# Patient Record
Sex: MALE | Race: WHITE | NOT HISPANIC OR LATINO | Employment: FULL TIME | URBAN - METROPOLITAN AREA
[De-identification: names, ages, dates, MRNs, and addresses within clinical notes are randomized per-mention and may not be internally consistent; named-entity substitution may affect disease eponyms.]

---

## 2018-01-23 ENCOUNTER — OFFICE VISIT (OUTPATIENT)
Dept: URGENT CARE | Facility: PHYSICIAN GROUP | Age: 48
End: 2018-01-23
Payer: COMMERCIAL

## 2018-01-23 VITALS
SYSTOLIC BLOOD PRESSURE: 142 MMHG | HEART RATE: 79 BPM | OXYGEN SATURATION: 96 % | BODY MASS INDEX: 37.22 KG/M2 | DIASTOLIC BLOOD PRESSURE: 90 MMHG | TEMPERATURE: 98 F | WEIGHT: 260 LBS | HEIGHT: 70 IN | RESPIRATION RATE: 17 BRPM

## 2018-01-23 DIAGNOSIS — J01.00 ACUTE NON-RECURRENT MAXILLARY SINUSITIS: ICD-10-CM

## 2018-01-23 PROCEDURE — 99203 OFFICE O/P NEW LOW 30 MIN: CPT | Performed by: PHYSICIAN ASSISTANT

## 2018-01-23 RX ORDER — DOXYCYCLINE HYCLATE 100 MG
100 TABLET ORAL 2 TIMES DAILY
Qty: 14 TAB | Refills: 0 | Status: SHIPPED | OUTPATIENT
Start: 2018-01-23 | End: 2018-01-30

## 2018-01-23 ASSESSMENT — ENCOUNTER SYMPTOMS
DIARRHEA: 0
NAUSEA: 0
SINUS PRESSURE: 1
SORE THROAT: 0
CHILLS: 0
SINUS PAIN: 1
SHORTNESS OF BREATH: 0
SPUTUM PRODUCTION: 0
COUGH: 0
ABDOMINAL PAIN: 0
MUSCULOSKELETAL NEGATIVE: 1
DIZZINESS: 0
VOMITING: 0
FEVER: 0

## 2018-01-23 NOTE — PROGRESS NOTES
"Subjective:      Alex Fong is a 47 y.o. male who presents with Sinus Problem (headache. X 8 days)            Sinus Problem   This is a new problem. The current episode started 1 to 4 weeks ago (8 days). The problem is unchanged. There has been no fever. His pain is at a severity of 2/10. The pain is mild. Associated symptoms include congestion and sinus pressure. Pertinent negatives include no chills, coughing, ear pain, shortness of breath, sneezing or sore throat. Past treatments include oral decongestants. The treatment provided mild relief.     Patient denies history of frequent/recurrent sinus infections or sinus surgeries. No fevers, chills, body aches, cough, chest pain, or SOB. He has been taking OTC decongestants with mild relief.     Review of Systems   Constitutional: Negative for chills and fever.   HENT: Positive for congestion, sinus pain and sinus pressure. Negative for ear pain, sneezing and sore throat.    Respiratory: Negative for cough, sputum production and shortness of breath.    Cardiovascular: Negative for chest pain.   Gastrointestinal: Negative for abdominal pain, diarrhea, nausea and vomiting.   Genitourinary: Negative.    Musculoskeletal: Negative.    Neurological: Negative for dizziness.          Objective:     /90   Pulse 79   Temp 36.7 °C (98 °F)   Resp 17   Ht 1.778 m (5' 10\")   Wt 117.9 kg (260 lb)   SpO2 96%   BMI 37.31 kg/m²      Physical Exam   Constitutional: He is oriented to person, place, and time. He appears well-developed and well-nourished. No distress.   HENT:   Head: Normocephalic and atraumatic.       Right Ear: Hearing, tympanic membrane, external ear and ear canal normal.   Left Ear: Hearing, tympanic membrane, external ear and ear canal normal.   Nose: Mucosal edema present. Right sinus exhibits maxillary sinus tenderness. Right sinus exhibits no frontal sinus tenderness. Left sinus exhibits maxillary sinus tenderness. Left sinus exhibits no frontal " sinus tenderness.   Mouth/Throat: Posterior oropharyngeal erythema present. No oropharyngeal exudate or posterior oropharyngeal edema.   Eyes: Conjunctivae are normal. Pupils are equal, round, and reactive to light.   Neck: Normal range of motion.   Cardiovascular: Normal rate, regular rhythm and normal heart sounds.    No murmur heard.  Pulmonary/Chest: Effort normal and breath sounds normal. No respiratory distress. He has no wheezes. He has no rales.   Musculoskeletal: Normal range of motion.   Lymphadenopathy:     He has no cervical adenopathy.   Neurological: He is alert and oriented to person, place, and time.   Skin: Skin is warm and dry. He is not diaphoretic.   Psychiatric: He has a normal mood and affect. His behavior is normal.   Nursing note and vitals reviewed.         PMH:  has no past medical history on file.  MEDS:   Current Outpatient Prescriptions:   •  DM-Doxylamine-Acetaminophen (NYQUIL COLD & FLU PO), Take  by mouth., Disp: , Rfl:   •  Oxymetazoline HCl (SINEX LONG-ACTING NA), Spray  in nose., Disp: , Rfl:   •  doxycycline (VIBRAMYCIN) 100 MG Tab, Take 1 Tab by mouth 2 times a day for 7 days., Disp: 14 Tab, Rfl: 0  ALLERGIES:   Allergies   Allergen Reactions   • Penicillin G      SURGHX: History reviewed. No pertinent surgical history.  SOCHX:  reports that he has never smoked. He has never used smokeless tobacco.  FH: family history is not on file.       Assessment/Plan:     1. Acute non-recurrent maxillary sinusitis  - doxycycline (VIBRAMYCIN) 100 MG Tab; Take 1 Tab by mouth 2 times a day for 7 days.  Dispense: 14 Tab; Refill: 0    Advised patient symptoms are most likely viral in etiology, recommend supportive care. Increased fluids and rest. Discussed use of nedi-pot, humidifier, and Flonase nasal spray for symptomatic relief. Contingent abx given if symptoms persist/worsen after 3-4 days. The patient demonstrated a good understanding and agreed with the treatment plan.

## 2018-02-17 ENCOUNTER — OFFICE VISIT (OUTPATIENT)
Dept: URGENT CARE | Facility: PHYSICIAN GROUP | Age: 48
End: 2018-02-17
Payer: COMMERCIAL

## 2018-02-17 VITALS
WEIGHT: 265 LBS | HEART RATE: 100 BPM | OXYGEN SATURATION: 97 % | RESPIRATION RATE: 16 BRPM | BODY MASS INDEX: 37.94 KG/M2 | DIASTOLIC BLOOD PRESSURE: 88 MMHG | SYSTOLIC BLOOD PRESSURE: 140 MMHG | TEMPERATURE: 97.9 F | HEIGHT: 70 IN

## 2018-02-17 DIAGNOSIS — M54.50 ACUTE BILATERAL LOW BACK PAIN WITHOUT SCIATICA: ICD-10-CM

## 2018-02-17 PROCEDURE — 99214 OFFICE O/P EST MOD 30 MIN: CPT | Mod: 25 | Performed by: FAMILY MEDICINE

## 2018-02-17 PROCEDURE — 90471 IMMUNIZATION ADMIN: CPT | Performed by: FAMILY MEDICINE

## 2018-02-17 PROCEDURE — 90686 IIV4 VACC NO PRSV 0.5 ML IM: CPT | Performed by: FAMILY MEDICINE

## 2018-02-17 RX ORDER — CYCLOBENZAPRINE HCL 10 MG
10 TABLET ORAL
Qty: 15 TAB | Refills: 0 | Status: SHIPPED | OUTPATIENT
Start: 2018-02-17 | End: 2018-03-06

## 2018-02-17 RX ORDER — MELOXICAM 15 MG/1
15 TABLET ORAL DAILY
Qty: 30 TAB | Refills: 0 | Status: SHIPPED | OUTPATIENT
Start: 2018-02-17 | End: 2018-03-06

## 2018-02-17 ASSESSMENT — ENCOUNTER SYMPTOMS
PERIANAL NUMBNESS: 0
PARESIS: 0
BOWEL INCONTINENCE: 0
NUMBNESS: 0
TINGLING: 0
BACK PAIN: 1

## 2018-02-17 NOTE — PROGRESS NOTES
"Subjective:   Alex Fong is a 47 y.o. male who presents for Back Pain (going down leggs, vomiting fever last week)        Back Pain   This is a new problem. The current episode started in the past 7 days. The problem occurs constantly. The problem has been gradually worsening since onset. The pain is present in the lumbar spine. The pain is moderate. Pertinent negatives include no bladder incontinence, bowel incontinence, numbness, paresis, perianal numbness or tingling.     Review of Systems   Gastrointestinal: Negative for bowel incontinence.   Genitourinary: Negative for bladder incontinence.   Musculoskeletal: Positive for back pain.   Neurological: Negative for tingling and numbness.     Allergies   Allergen Reactions   • Penicillin G       Objective:   /88   Pulse 100   Temp 36.6 °C (97.9 °F)   Resp 16   Ht 1.778 m (5' 10\")   Wt 120.2 kg (265 lb)   SpO2 97%   BMI 38.02 kg/m²   Physical Exam   Constitutional: He is oriented to person, place, and time. He appears well-developed and well-nourished. No distress.   HENT:   Head: Normocephalic and atraumatic.   Eyes: Conjunctivae and EOM are normal. Pupils are equal, round, and reactive to light.   Cardiovascular: Normal rate, regular rhythm and intact distal pulses.    No murmur heard.  Pulmonary/Chest: Effort normal and breath sounds normal. No respiratory distress.   Abdominal: Soft. He exhibits no distension. There is no tenderness.   Musculoskeletal:        Lumbar back: He exhibits decreased range of motion, tenderness and spasm. He exhibits no bony tenderness.   Neurological: He is alert and oriented to person, place, and time. He has normal reflexes. No sensory deficit.   Skin: Skin is warm and dry.   Psychiatric: He has a normal mood and affect. His behavior is normal.   Vitals reviewed.        Assessment/Plan:   Assessment    1. Acute bilateral low back pain without sciatica  - meloxicam (MOBIC) 15 MG tablet; Take 1 Tab by mouth every day.  " Dispense: 30 Tab; Refill: 0  - cyclobenzaprine (FLEXERIL) 10 MG Tab; Take 1 Tab by mouth at bedtime as needed.  Dispense: 15 Tab; Refill: 0  - Flu Quad Inj >3 Year Pre-Filled PF  Differential diagnosis, natural history, supportive care, and indications for immediate follow-up discussed.

## 2018-03-06 ENCOUNTER — OFFICE VISIT (OUTPATIENT)
Dept: MEDICAL GROUP | Facility: PHYSICIAN GROUP | Age: 48
End: 2018-03-06
Payer: COMMERCIAL

## 2018-03-06 VITALS
TEMPERATURE: 98.2 F | WEIGHT: 264 LBS | DIASTOLIC BLOOD PRESSURE: 88 MMHG | OXYGEN SATURATION: 95 % | HEART RATE: 91 BPM | BODY MASS INDEX: 36.96 KG/M2 | RESPIRATION RATE: 14 BRPM | SYSTOLIC BLOOD PRESSURE: 136 MMHG | HEIGHT: 71 IN

## 2018-03-06 DIAGNOSIS — M54.41 CHRONIC BILATERAL LOW BACK PAIN WITH RIGHT-SIDED SCIATICA: ICD-10-CM

## 2018-03-06 DIAGNOSIS — G89.29 CHRONIC BILATERAL LOW BACK PAIN WITH RIGHT-SIDED SCIATICA: ICD-10-CM

## 2018-03-06 DIAGNOSIS — R74.8 ELEVATED LIVER ENZYMES: ICD-10-CM

## 2018-03-06 DIAGNOSIS — I51.7 LEFT VENTRICULAR ENLARGEMENT: ICD-10-CM

## 2018-03-06 DIAGNOSIS — E66.9 OBESITY (BMI 35.0-39.9 WITHOUT COMORBIDITY): ICD-10-CM

## 2018-03-06 DIAGNOSIS — G45.9 MINI STROKE: ICD-10-CM

## 2018-03-06 DIAGNOSIS — Z87.898 HISTORY OF SNORING: ICD-10-CM

## 2018-03-06 DIAGNOSIS — Z85.47 HISTORY OF TESTICULAR CANCER: ICD-10-CM

## 2018-03-06 PROCEDURE — 99214 OFFICE O/P EST MOD 30 MIN: CPT | Performed by: NURSE PRACTITIONER

## 2018-03-06 RX ORDER — OMEPRAZOLE 20 MG/1
20 TABLET, DELAYED RELEASE ORAL DAILY
COMMUNITY

## 2018-03-06 ASSESSMENT — PATIENT HEALTH QUESTIONNAIRE - PHQ9: CLINICAL INTERPRETATION OF PHQ2 SCORE: 0

## 2018-03-06 NOTE — ASSESSMENT & PLAN NOTE
No longer on bp meds or blood thinners.  No symptoms of weakness reported.  Not taking a daily aspirin.

## 2018-03-06 NOTE — ASSESSMENT & PLAN NOTE
No concerns other than his oncologist in [place] recommended a CXR every 2 years.  He is requesting CXR today.

## 2018-03-06 NOTE — PROGRESS NOTES
"Alex Fong is a 47 y.o. male here today to establish care and for evaluation and management of:    HPI:    Left ventricular enlargement  No chest pain reported, dyspneic with stair climbing. No ankle swelling reported.     Mini stroke (CMS-HCC)  No longer on bp meds or blood thinners.  No symptoms of weakness reported.  Not taking a daily aspirin.     Obesity (BMI 35.0-39.9 without comorbidity) ([facility])  BMI 37.34 today.  No current exercise program.     History of testicular cancer  No concerns other than his oncologist in [place] recommended a CXR every 2 years.  He is requesting CXR today.     Low back pain  Reports that he had some back pain a couple of months ago, but this has resolved.       Current medicines (including changes today)  Current Outpatient Prescriptions   Medication Sig Dispense Refill   • omeprazole (PRILOSEC OTC) 20 MG tablet Take 20 mg by mouth every day.       No current facility-administered medications for this visit.        He  has a past medical history of Cancer (CMS-HCC); Diverticulosis; Fatty liver disease, nonalcoholic; Low back pain; and Mini stroke (CMS-HCC).    He  has a past surgical history that includes eye surgery and orchiectomy (Left).    Social History   Substance Use Topics   • Smoking status: Never Smoker   • Smokeless tobacco: Never Used   • Alcohol use No       Social History     Social History Narrative   • No narrative on file       Family History   Problem Relation Age of Onset   • Cancer Mother      thyroid   • Heart Disease Father    • Hypertension Father        Family Status   Relation Status   • Mother Alive   • Father          ROS  As stated in hpi  All other systems reviewed and are negative     Objective:     Blood pressure 136/88, pulse 91, temperature 36.8 °C (98.2 °F), resp. rate 14, height 1.791 m (5' 10.5\"), weight 119.7 kg (264 lb), SpO2 95 %. Body mass index is 37.34 kg/m².  Physical Exam:    Constitutional: Alert, no distress.  Skin: Warm, " dry, good turgor, no rashes in visible areas. Sebaceous cyst noted at top of scalp.   Eye: Equal, round and reactive, conjunctiva clear, lids normal.  ENMT: Lips without lesions, good dentition, oropharynx clear.  Neck: Trachea midline, no masses, no thyromegaly. No cervical or supraclavicular lymphadenopathy.  Respiratory: Unlabored respiratory effort, lungs clear to auscultation, no wheezes, no ronchi.  Cardiovascular: Normal S1, S2, no murmur, no edema.  Abdomen: Soft, non-tender, no masses, no hepatosplenomegaly.  Psych: Alert and oriented x3, normal affect and mood.        Assessment and Plan:   The following treatment plan was discussed    1. Chronic bilateral low back pain with right-sided sciatica  This is a new problem to me.  Chronic. Stable at this time. monitor    2. Left ventricular enlargement  This is a new problem to me.  Chronic.  Unknown etiology.  Request referral to cardiology based on events from his history of mini stroke/testicular cancer.  Referral placed.  Labs ordered.  Monitor and follow.   - CBC WITH DIFFERENTIAL; Future  - COMP METABOLIC PANEL; Future  - LIPID PROFILE; Future  - TSH; Future  - REFERRAL TO CARDIOLOGY    3. History of testicular cancer  This is a new problem to me.  Chronic. Stable.  Will order CXR per patient request from oncologist.  Monitor results.   - DX-CHEST-2 VIEWS; Future    4. Mini stroke (CMS-HCC)  This is a new problem to me.  Historical.  No acute issues.  Advised patient to add daily 81 mg asa daily.     5. Elevated liver enzymes  This is a new problem to me.  Historical.  Will draw labs.  Monitor and follow.  No alcohol by history    6. History of snoring  This is a new problem to me.  Chronic.  Will order overnight oxygen test.  Monitor and follow results.     7. Obesity (BMI 35.0-39.9 without comorbidity)  This is a new problem to me.  Chronic.  Diet and exercise discussed.   - Patient identified as having weight management issue.  Appropriate orders and  counseling given.      Records requested.  Followup: Return in about 6 months (around 9/6/2018) for Multiple issues.

## 2018-07-09 ENCOUNTER — OFFICE VISIT (OUTPATIENT)
Dept: URGENT CARE | Facility: PHYSICIAN GROUP | Age: 48
End: 2018-07-09
Payer: COMMERCIAL

## 2018-07-09 VITALS
WEIGHT: 263 LBS | SYSTOLIC BLOOD PRESSURE: 122 MMHG | TEMPERATURE: 97.9 F | DIASTOLIC BLOOD PRESSURE: 86 MMHG | HEIGHT: 70 IN | BODY MASS INDEX: 37.65 KG/M2 | HEART RATE: 97 BPM | OXYGEN SATURATION: 98 % | RESPIRATION RATE: 16 BRPM
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
## 2018-07-09 PROCEDURE — 99213 OFFICE O/P EST LOW 20 MIN: CPT | Performed by: PHYSICIAN ASSISTANT

## 2018-07-09 RX ORDER — SULFAMETHOXAZOLE AND TRIMETHOPRIM 800; 160 MG/1; MG/1
1 TABLET ORAL 2 TIMES DAILY
Qty: 14 TAB | Refills: 0 | Status: SHIPPED | OUTPATIENT
Start: 2018-07-09 | End: 2018-07-16

## 2018-07-09 ASSESSMENT — ENCOUNTER SYMPTOMS
DIARRHEA: 0
ROS SKIN COMMENTS: + INSECT BITE
SHORTNESS OF BREATH: 0
FEVER: 0
MUSCULOSKELETAL NEGATIVE: 1
SORE THROAT: 0
ABDOMINAL PAIN: 0
VOMITING: 0
DIZZINESS: 0
CHILLS: 0
NAUSEA: 0

## 2018-07-10 NOTE — PROGRESS NOTES
"Subjective:      Alex Fong is a 48 y.o. male who presents with Spider Bite (poss spider bite inside of L thigh, pain spreading down leg)        Patient is accompanied by his wife.     HPI   Patient presents to urgent care reporting a 1 week history of worsening swellingand pain on his left posterior thigh that he believes initially started as an insect bite. He is otherwise feeling well and denies fevers, chills, body aches, nausea, or vomiting. No history of MRSA.     Review of Systems   Constitutional: Negative for chills and fever.   HENT: Negative for congestion and sore throat.    Respiratory: Negative for shortness of breath.    Cardiovascular: Negative for chest pain.   Gastrointestinal: Negative for abdominal pain, diarrhea, nausea and vomiting.   Genitourinary: Negative.    Musculoskeletal: Negative.    Skin: Negative for itching and rash.        + insect bite   Neurological: Negative for dizziness.        Objective:     /86   Pulse 97   Temp 36.6 °C (97.9 °F)   Resp 16   Ht 1.778 m (5' 10\")   Wt 119.3 kg (263 lb)   SpO2 98%   BMI 37.74 kg/m²      Physical Exam   Constitutional: He is oriented to person, place, and time. He appears well-developed and well-nourished. No distress.   HENT:   Head: Normocephalic and atraumatic.   Eyes: Pupils are equal, round, and reactive to light.   Neck: Normal range of motion.   Cardiovascular: Normal rate.    Pulmonary/Chest: Effort normal.   Musculoskeletal: Normal range of motion.   Neurological: He is alert and oriented to person, place, and time.   Skin: Skin is warm and dry. He is not diaphoretic.        Area of raised erythema and induration noted on left posterior thigh with slight TTP. No warmth to touch or fluctuance present.    Psychiatric: He has a normal mood and affect. His behavior is normal.   Nursing note and vitals reviewed.         PMH:  has a past medical history of Cancer (HCC); Diverticulosis; Fatty liver disease, nonalcoholic; Low back " pain; and Mini stroke (HCC).  MEDS:   Current Outpatient Prescriptions:   •  sulfamethoxazole-trimethoprim (BACTRIM DS) 800-160 MG tablet, Take 1 Tab by mouth 2 times a day for 7 days., Disp: 14 Tab, Rfl: 0  •  omeprazole (PRILOSEC OTC) 20 MG tablet, Take 20 mg by mouth every day., Disp: , Rfl:   ALLERGIES:   Allergies   Allergen Reactions   • Penicillin G      SURGHX:   Past Surgical History:   Procedure Laterality Date   • EYE SURGERY      corneal transplants   • ORCHIECTOMY Left     testicular cancer     SOCHX:  reports that he has never smoked. He has never used smokeless tobacco. He reports that he does not drink alcohol or use drugs.  FH: family history includes Cancer in his mother; Heart Disease in his father; Hypertension in his father.       Assessment/Plan:     1. Bug bite with infection, initial encounter  - sulfamethoxazole-trimethoprim (BACTRIM DS) 800-160 MG tablet; Take 1 Tab by mouth 2 times a day for 7 days.  Dispense: 14 Tab; Refill: 0   - Complete full course of antibiotics as prescribed     No area of fluctuance today, I&D not indicated. Encouraged warm compresses 3-4 times daily and close monitoring of area. Call or return to office if symptoms persist or worsen. The patient demonstrated a good understanding and agreed with the treatment plan.

## 2018-08-28 ENCOUNTER — HOSPITAL ENCOUNTER (OUTPATIENT)
Dept: RADIOLOGY | Facility: MEDICAL CENTER | Age: 48
End: 2018-08-28
Attending: NURSE PRACTITIONER
Payer: COMMERCIAL

## 2018-08-28 ENCOUNTER — HOSPITAL ENCOUNTER (OUTPATIENT)
Dept: LAB | Facility: MEDICAL CENTER | Age: 48
End: 2018-08-28
Attending: NURSE PRACTITIONER
Payer: COMMERCIAL

## 2018-08-28 DIAGNOSIS — Z85.47 HISTORY OF TESTICULAR CANCER: ICD-10-CM

## 2018-08-28 DIAGNOSIS — I51.7 LEFT VENTRICULAR ENLARGEMENT: ICD-10-CM

## 2018-08-28 LAB
ALBUMIN SERPL BCP-MCNC: 4 G/DL (ref 3.2–4.9)
ALBUMIN/GLOB SERPL: 1.2 G/DL
ALP SERPL-CCNC: 80 U/L (ref 30–99)
ALT SERPL-CCNC: 75 U/L (ref 2–50)
ANION GAP SERPL CALC-SCNC: 6 MMOL/L (ref 0–11.9)
AST SERPL-CCNC: 39 U/L (ref 12–45)
BASOPHILS # BLD AUTO: 0.6 % (ref 0–1.8)
BASOPHILS # BLD: 0.05 K/UL (ref 0–0.12)
BILIRUB SERPL-MCNC: 0.4 MG/DL (ref 0.1–1.5)
BUN SERPL-MCNC: 15 MG/DL (ref 8–22)
CALCIUM SERPL-MCNC: 9.8 MG/DL (ref 8.5–10.5)
CHLORIDE SERPL-SCNC: 102 MMOL/L (ref 96–112)
CHOLEST SERPL-MCNC: 104 MG/DL (ref 100–199)
CO2 SERPL-SCNC: 27 MMOL/L (ref 20–33)
CREAT SERPL-MCNC: 0.93 MG/DL (ref 0.5–1.4)
EOSINOPHIL # BLD AUTO: 0.02 K/UL (ref 0–0.51)
EOSINOPHIL NFR BLD: 0.2 % (ref 0–6.9)
ERYTHROCYTE [DISTWIDTH] IN BLOOD BY AUTOMATED COUNT: 45.2 FL (ref 35.9–50)
GLOBULIN SER CALC-MCNC: 3.4 G/DL (ref 1.9–3.5)
GLUCOSE SERPL-MCNC: 371 MG/DL (ref 65–99)
HCT VFR BLD AUTO: 49.1 % (ref 42–52)
HDLC SERPL-MCNC: 48 MG/DL
HGB BLD-MCNC: 17.1 G/DL (ref 14–18)
IMM GRANULOCYTES # BLD AUTO: 0.03 K/UL (ref 0–0.11)
IMM GRANULOCYTES NFR BLD AUTO: 0.3 % (ref 0–0.9)
LDLC SERPL CALC-MCNC: 45 MG/DL
LYMPHOCYTES # BLD AUTO: 2.43 K/UL (ref 1–4.8)
LYMPHOCYTES NFR BLD: 27.1 % (ref 22–41)
MCH RBC QN AUTO: 31.8 PG (ref 27–33)
MCHC RBC AUTO-ENTMCNC: 34.8 G/DL (ref 33.7–35.3)
MCV RBC AUTO: 91.4 FL (ref 81.4–97.8)
MONOCYTES # BLD AUTO: 0.75 K/UL (ref 0–0.85)
MONOCYTES NFR BLD AUTO: 8.4 % (ref 0–13.4)
NEUTROPHILS # BLD AUTO: 5.7 K/UL (ref 1.82–7.42)
NEUTROPHILS NFR BLD: 63.4 % (ref 44–72)
NRBC # BLD AUTO: 0 K/UL
NRBC BLD-RTO: 0 /100 WBC
PLATELET # BLD AUTO: 154 K/UL (ref 164–446)
PMV BLD AUTO: 11.7 FL (ref 9–12.9)
POTASSIUM SERPL-SCNC: 4.3 MMOL/L (ref 3.6–5.5)
PROT SERPL-MCNC: 7.4 G/DL (ref 6–8.2)
RBC # BLD AUTO: 5.37 M/UL (ref 4.7–6.1)
SODIUM SERPL-SCNC: 135 MMOL/L (ref 135–145)
TRIGL SERPL-MCNC: 53 MG/DL (ref 0–149)
TSH SERPL DL<=0.005 MIU/L-ACNC: 1.37 UIU/ML (ref 0.38–5.33)
WBC # BLD AUTO: 9 K/UL (ref 4.8–10.8)

## 2018-08-28 PROCEDURE — 36415 COLL VENOUS BLD VENIPUNCTURE: CPT

## 2018-08-28 PROCEDURE — 85025 COMPLETE CBC W/AUTO DIFF WBC: CPT

## 2018-08-28 PROCEDURE — 80061 LIPID PANEL: CPT

## 2018-08-28 PROCEDURE — 80053 COMPREHEN METABOLIC PANEL: CPT

## 2018-08-28 PROCEDURE — 84443 ASSAY THYROID STIM HORMONE: CPT

## 2018-08-28 PROCEDURE — 71046 X-RAY EXAM CHEST 2 VIEWS: CPT

## 2018-08-29 ENCOUNTER — TELEPHONE (OUTPATIENT)
Dept: MEDICAL GROUP | Facility: PHYSICIAN GROUP | Age: 48
End: 2018-08-29

## 2018-08-29 NOTE — TELEPHONE ENCOUNTER
----- Message from ROGER Briggs sent at 8/29/2018  7:07 AM PDT -----  Please let patient know that labs are back.  I am not sure if this test was fasting or not?  The blood sugar is quite elevated.   We can review at your upcoming September appointment.    The chest xray results were all normal.  See you soon.   ROGER Briggs

## 2018-08-30 ENCOUNTER — OFFICE VISIT (OUTPATIENT)
Dept: MEDICAL GROUP | Facility: PHYSICIAN GROUP | Age: 48
End: 2018-08-30
Payer: COMMERCIAL

## 2018-08-30 VITALS
HEIGHT: 71 IN | TEMPERATURE: 97.5 F | BODY MASS INDEX: 36.4 KG/M2 | SYSTOLIC BLOOD PRESSURE: 132 MMHG | HEART RATE: 91 BPM | OXYGEN SATURATION: 98 % | DIASTOLIC BLOOD PRESSURE: 88 MMHG | WEIGHT: 260 LBS

## 2018-08-30 DIAGNOSIS — R74.8 ELEVATED LIVER ENZYMES: ICD-10-CM

## 2018-08-30 DIAGNOSIS — R73.9 ELEVATED BLOOD SUGAR: ICD-10-CM

## 2018-08-30 DIAGNOSIS — E11.9 TYPE 2 DIABETES MELLITUS WITHOUT COMPLICATION, WITHOUT LONG-TERM CURRENT USE OF INSULIN (HCC): ICD-10-CM

## 2018-08-30 DIAGNOSIS — Z85.47 HISTORY OF TESTICULAR CANCER: ICD-10-CM

## 2018-08-30 DIAGNOSIS — E66.9 DIABETES MELLITUS TYPE 2 IN OBESE (HCC): ICD-10-CM

## 2018-08-30 DIAGNOSIS — R73.09 ELEVATED GLUCOSE: ICD-10-CM

## 2018-08-30 DIAGNOSIS — E11.69 DIABETES MELLITUS TYPE 2 IN OBESE (HCC): ICD-10-CM

## 2018-08-30 LAB
HBA1C MFR BLD: 11.7 % (ref ?–5.8)
INT CON NEG: NEGATIVE
INT CON POS: POSITIVE

## 2018-08-30 PROCEDURE — 99214 OFFICE O/P EST MOD 30 MIN: CPT | Performed by: NURSE PRACTITIONER

## 2018-08-30 PROCEDURE — 83036 HEMOGLOBIN GLYCOSYLATED A1C: CPT | Performed by: NURSE PRACTITIONER

## 2018-08-30 RX ORDER — SIMVASTATIN 5 MG
5 TABLET ORAL EVERY EVENING
Qty: 30 TAB | Refills: 2 | Status: SHIPPED | OUTPATIENT
Start: 2018-08-30

## 2018-08-30 RX ORDER — LISINOPRIL 10 MG/1
10 TABLET ORAL DAILY
Qty: 30 TAB | Refills: 2 | Status: SHIPPED | OUTPATIENT
Start: 2018-08-30 | End: 2019-02-26 | Stop reason: SDUPTHER

## 2018-08-31 NOTE — PROGRESS NOTES
Chief Complaint   Patient presents with   • Abnormal Labs     lab review       Subjective:   Agapito Fong is a 48 y.o. male here today for evaluation and management of:    Elevated blood sugar  Reports that this summer he has been very very thirsty, having more difficulty with erections.  .  No immediate family history of diabetes.  BMI 36.78.      Elevated liver enzymes  Results for AGAPITO FONG (MRN [id number]) as of 8/30/2018 17:03   Ref. Range 8/28/2018 09:05 8/28/2018 09:46   AST(SGOT) Latest Ref Range: 12 - 45 U/L 39    ALT(SGPT) Latest Ref Range: 2 - 50 U/L 75 (H)    Alkaline Phosphatase Latest Ref Range: 30 - 99 U/L 80    Total Bilirubin Latest Ref Range: 0.1 - 1.5 mg/dL 0.4    Most recent labs show slightly elevated ALT.  No alcohol or medications.  This is reported as a historical finding.  No hx of hepatitis. No acute symptoms reported.     Diabetes mellitus type 2 in obese (HCC)  Patient had elevated bs of 371 on routine labs.  A1c today 11.7.  Has been thirsty and having difficulty with erections.  No numbness or tingling in feet reported.  No visual changes reported.  No chest pain, shortness of breath.  Patient reports he is eating lots of strudel and treats.  BMI 36.78    History of testicular cancer  Chest xray for follow up clear 8/18           Current medicines (including changes today)  Current Outpatient Prescriptions   Medication Sig Dispense Refill   • Ibuprofen (ADVIL PO) Take  by mouth.     • metFORMIN (GLUCOPHAGE) 500 MG Tab Take 1 Tab by mouth 2 times a day, with meals. 60 Tab 3   • lisinopril (PRINIVIL) 10 MG Tab Take 1 Tab by mouth every day. 30 Tab 2   • simvastatin (ZOCOR) 5 MG Tab Take 1 Tab by mouth every evening. 30 Tab 2   • omeprazole (PRILOSEC OTC) 20 MG tablet Take 20 mg by mouth every day.       No current facility-administered medications for this visit.      He  has a past medical history of Cancer (HCC); Diverticulosis; Fatty liver disease, nonalcoholic; Low back pain;  "and Mini stroke (HCC).    ROS as stated in hpi  No chest pain, no shortness of breath, no abdominal pain       Objective:     Blood pressure 132/88, pulse 91, temperature 36.4 °C (97.5 °F), height 1.791 m (5' 10.5\"), weight 117.9 kg (260 lb), SpO2 98 %. Body mass index is 36.78 kg/m². bp slightly elevated today  Physical Exam:  Constitutional: Alert, no distress.  Skin: Warm, dry, good turgor,no cyanosis, no rashes in visible areas.  Eye: Equal, round and reactive, conjunctiva clear, lids normal.  Ears: No tenderness, no discharge.  External canals are without any significant edema or erythema.  Tympanic membranes are without any inflammation, no effusion.  Gross auditory acuity is intact.  Nose: symmetrical without tenderness, no discharge.  Mouth/Throat: lips without lesion.  Oropharynx clear.  Throat without erythema, exudates or tonsillar enlargement.  Neck: Trachea midline, no masses, no obvious thyroid enlargement.. No cervical or supraclavicular lymphadenopathy. Range of motion within normal limits.  Neuro: Cranial nerves 2-12 grossly intact.  No sensory deficit.  Respiratory: Unlabored respiratory effort, lungs clear to auscultation, no wheezes, no ronchi.  Cardiovascular: Normal S1, S2, no murmur, no edema.  Abdomen: Soft, non-tender, no masses, no guarding,  no hepatosplenomegaly.  Psych: Alert and oriented x3, normal affect and mood and judgement.        Assessment and Plan:   The following treatment plan was discussed    1. Elevated glucose  This is a new problem to me.  Acute. Ongoing.  A1c 11.7.  See #4  - POCT  A1C    2. Elevated blood sugar  See number 4    3. Elevated liver enzymes  Chronic, ongoing. Stable at this time.     4. Type 2 diabetes mellitus without complication, without long-term current use of insulin (HCC)  This is anew problem to me.  Acute. Unstable.  Metformin 500 mg bid will increase in one month.  Statin, Ace and ASA started.  Referral to dietician for education.  Handout " regarding diet, portion control and exercise reviewed with patient.  Patient to return in 4 weeks for follow up.  At that time, we may have patient meet with DM RN in the next 6 months for monitoring and medication management.   - REFERRAL TO [facility] IMPROVEMENT PROGRAMS (HIP) Services Requested: Registered Dietitian for Medical Nutrition Therapy; Reason for Visit: Medical Condition Requiring Nutrition Counseling    5. Diabetes mellitus type 2 in obese (HCC)  See #4    6. History of testicular cancer  Chronic, ongoing. Stable.  Recent CXR was negative for any mets or lesions      Followup: Return in about 4 weeks (around 9/27/2018) for Diabetes.         Educated in proper administration of medication(s) ordered today including safety, possible SE, risks, benefits, rationale and alternatives to therapy.     Please note that this dictation was created using voice recognition software. I have made every reasonable attempt to correct obvious errors, but I expect that there are errors of grammar and possibly content that I did not discover before finalizing the note.

## 2018-08-31 NOTE — ASSESSMENT & PLAN NOTE
Patient had elevated bs of 371 on routine labs.  A1c today 11.7.  Has been thirsty and having difficulty with erections.  No numbness or tingling in feet reported.  No visual changes reported.  No chest pain, shortness of breath.  Patient reports he is eating lots of strudel and treats.  BMI 36.78

## 2018-08-31 NOTE — ASSESSMENT & PLAN NOTE
Results for AGAPITO DE LA CRUZ (MRN [id number]) as of 8/30/2018 17:03   Ref. Range 8/28/2018 09:05 8/28/2018 09:46   AST(SGOT) Latest Ref Range: 12 - 45 U/L 39    ALT(SGPT) Latest Ref Range: 2 - 50 U/L 75 (H)    Alkaline Phosphatase Latest Ref Range: 30 - 99 U/L 80    Total Bilirubin Latest Ref Range: 0.1 - 1.5 mg/dL 0.4    Most recent labs show slightly elevated ALT.  No alcohol or medications.  This is reported as a historical finding.  No hx of hepatitis. No acute symptoms reported.

## 2018-08-31 NOTE — ASSESSMENT & PLAN NOTE
Reports that this summer he has been very very thirsty, having more difficulty with erections.  .  No immediate family history of diabetes.  BMI 36.78.

## 2018-09-11 ENCOUNTER — OFFICE VISIT (OUTPATIENT)
Dept: URGENT CARE | Facility: PHYSICIAN GROUP | Age: 48
End: 2018-09-11
Payer: COMMERCIAL

## 2018-09-11 VITALS
OXYGEN SATURATION: 98 % | BODY MASS INDEX: 37.22 KG/M2 | HEART RATE: 97 BPM | TEMPERATURE: 96.5 F | HEIGHT: 70 IN | SYSTOLIC BLOOD PRESSURE: 126 MMHG | RESPIRATION RATE: 16 BRPM | DIASTOLIC BLOOD PRESSURE: 80 MMHG | WEIGHT: 260 LBS

## 2018-09-11 DIAGNOSIS — R10.9 ABDOMINAL PAIN, UNSPECIFIED ABDOMINAL LOCATION: ICD-10-CM

## 2018-09-11 PROCEDURE — 99214 OFFICE O/P EST MOD 30 MIN: CPT | Performed by: FAMILY MEDICINE

## 2018-09-11 RX ORDER — DICYCLOMINE HYDROCHLORIDE 10 MG/1
10 CAPSULE ORAL
Qty: 30 CAP | Refills: 0 | Status: SHIPPED | OUTPATIENT
Start: 2018-09-11

## 2018-09-11 ASSESSMENT — PAIN SCALES - GENERAL: PAINLEVEL: 10=SEVERE PAIN

## 2018-09-12 ASSESSMENT — ENCOUNTER SYMPTOMS
ABDOMINAL PAIN: 1
HEMATOCHEZIA: 0
ARTHRALGIAS: 0
CONSTIPATION: 0
DIARRHEA: 0
ANOREXIA: 0
HEADACHES: 0
FEVER: 0
MYALGIAS: 0
FLATUS: 0
VOMITING: 0
NAUSEA: 0
BELCHING: 0

## 2018-09-12 NOTE — PROGRESS NOTES
"Subjective:   Alex Fong is a 48 y.o. male who presents for Abdominal Pain (x2days )        Abdominal Pain   This is a new problem. The current episode started yesterday. The onset quality is sudden. The problem occurs constantly. The problem has been gradually worsening. The pain is located in the generalized abdominal region. The pain is mild. The quality of the pain is colicky and cramping. The abdominal pain does not radiate. Pertinent negatives include no anorexia, arthralgias, belching, constipation, diarrhea, dysuria, fever, flatus, headaches, hematochezia, hematuria, melena, myalgias, nausea or vomiting.     Review of Systems   Constitutional: Negative for fever.   Gastrointestinal: Positive for abdominal pain. Negative for anorexia, constipation, diarrhea, flatus, hematochezia, melena, nausea and vomiting.   Genitourinary: Negative for dysuria and hematuria.   Musculoskeletal: Negative for arthralgias and myalgias.   Neurological: Negative for headaches.     Allergies   Allergen Reactions   • Penicillin G       Objective:   /80   Pulse 97   Temp 35.8 °C (96.5 °F)   Resp 16   Ht 1.778 m (5' 10\")   Wt 117.9 kg (260 lb)   SpO2 98%   BMI 37.31 kg/m²   Physical Exam   Constitutional: He is oriented to person, place, and time. He appears well-developed and well-nourished. No distress.   HENT:   Head: Normocephalic and atraumatic.   Eyes: Pupils are equal, round, and reactive to light. Conjunctivae and EOM are normal.   Cardiovascular: Normal rate and regular rhythm.    No murmur heard.  Pulmonary/Chest: Effort normal and breath sounds normal. No respiratory distress.   Abdominal: Soft. He exhibits no distension. There is tenderness. There is no rebound and no guarding.   Neurological: He is alert and oriented to person, place, and time. He has normal reflexes. No sensory deficit.   Skin: Skin is warm and dry.   Psychiatric: He has a normal mood and affect.         Assessment/Plan:   Assessment  "   1. Abdominal pain, unspecified abdominal location  - dicyclomine (BENTYL) 10 MG Cap; Take 1 Cap by mouth 4 Times a Day,Before Meals and at Bedtime.  Dispense: 30 Cap; Refill: 0    Differential diagnosis, natural history, supportive care, and indications for immediate follow-up discussed.

## 2018-09-17 ENCOUNTER — TELEPHONE (OUTPATIENT)
Dept: MEDICAL GROUP | Facility: PHYSICIAN GROUP | Age: 48
End: 2018-09-17

## 2018-09-18 NOTE — TELEPHONE ENCOUNTER
Please advise patient to return to one/day of the metformin.  We will review together at appointment on 10/2/18  JEAN Briggs.

## 2018-09-18 NOTE — TELEPHONE ENCOUNTER
VOICEMAIL  1. Caller Name: Alex                      Call Back Number: [phone] (home)       2. Message: Patient had GI trouble when he increased his metformin to BID and was seen in UC. He seemed to do ok on 1 but not 2. He is wondering if he should just take one or change medications? Please advise     3. Patient approves office to leave a detailed voicemail/MyChart message: N\A

## 2018-09-18 NOTE — TELEPHONE ENCOUNTER
Phone Number Called: [phone] (home)       Message: patient advised of message below    Left Message for patient to call back: N\A

## 2018-10-02 ENCOUNTER — OFFICE VISIT (OUTPATIENT)
Dept: MEDICAL GROUP | Facility: PHYSICIAN GROUP | Age: 48
End: 2018-10-02
Payer: COMMERCIAL

## 2018-10-02 ENCOUNTER — HOSPITAL ENCOUNTER (OUTPATIENT)
Dept: RADIOLOGY | Facility: MEDICAL CENTER | Age: 48
End: 2018-10-02
Attending: NURSE PRACTITIONER
Payer: COMMERCIAL

## 2018-10-02 VITALS
HEART RATE: 92 BPM | BODY MASS INDEX: 37.22 KG/M2 | DIASTOLIC BLOOD PRESSURE: 82 MMHG | TEMPERATURE: 97.6 F | HEIGHT: 70 IN | OXYGEN SATURATION: 95 % | SYSTOLIC BLOOD PRESSURE: 130 MMHG | WEIGHT: 260 LBS

## 2018-10-02 DIAGNOSIS — E11.9 TYPE 2 DIABETES MELLITUS WITHOUT COMPLICATION, WITHOUT LONG-TERM CURRENT USE OF INSULIN (HCC): ICD-10-CM

## 2018-10-02 DIAGNOSIS — K57.30 DIVERTICULOSIS OF LARGE INTESTINE WITHOUT HEMORRHAGE: ICD-10-CM

## 2018-10-02 DIAGNOSIS — R10.30 LOWER ABDOMINAL PAIN: ICD-10-CM

## 2018-10-02 PROCEDURE — 74019 RADEX ABDOMEN 2 VIEWS: CPT

## 2018-10-02 PROCEDURE — 99214 OFFICE O/P EST MOD 30 MIN: CPT | Performed by: NURSE PRACTITIONER

## 2018-10-02 RX ORDER — METRONIDAZOLE 500 MG/1
500 TABLET ORAL 3 TIMES DAILY
Qty: 30 TAB | Refills: 0 | Status: SHIPPED | OUTPATIENT
Start: 2018-10-02 | End: 2019-02-26

## 2018-10-02 RX ORDER — SULFAMETHOXAZOLE AND TRIMETHOPRIM 800; 160 MG/1; MG/1
1 TABLET ORAL 2 TIMES DAILY
Qty: 20 TAB | Refills: 0 | Status: SHIPPED | OUTPATIENT
Start: 2018-10-02 | End: 2019-02-26

## 2018-10-02 NOTE — ASSESSMENT & PLAN NOTE
Reports past history of this based on colonoscopy.  Avoids seeds/nuts.   Last flare was approximiately one year ago.

## 2018-10-02 NOTE — PROGRESS NOTES
Chief Complaint   Patient presents with   • Diabetes   • GI Problem     x5 days, severe abd pain       Subjective:   Alex Fong is a 48 y.o. male here today for evaluation and management of:    Lower abdominal pain  Started to have some abdominal pain in lower abdomen last Friday.  Some constipation reported.  Took a laxative with some help yesterday.  Still having pain.  Pain is specific to lower abdomen. No nausea/vomitting, but a lot of gas.  No fever, chills reported.  Reports horrific pain at 10 that is intermittent.  Taking daily prilosec.  No black or tarry stools.  Reports that in the past he has had a history of diverticulitis.     Diverticulosis of large intestine  Reports past history of this based on colonoscopy.  Avoids seeds/nuts.   Last flare was approximiately one year ago.            Current medicines (including changes today)  Current Outpatient Prescriptions   Medication Sig Dispense Refill   • sulfamethoxazole-trimethoprim (BACTRIM DS) 800-160 MG tablet Take 1 Tab by mouth 2 times a day. 20 Tab 0   • metroNIDAZOLE (FLAGYL) 500 MG Tab Take 1 Tab by mouth 3 times a day. 30 Tab 0   • dicyclomine (BENTYL) 10 MG Cap Take 1 Cap by mouth 4 Times a Day,Before Meals and at Bedtime. 30 Cap 0   • metFORMIN (GLUCOPHAGE) 500 MG Tab Take 1 Tab by mouth 2 times a day, with meals. (Patient taking differently: Take 500 mg by mouth every day at 6 PM.) 60 Tab 3   • lisinopril (PRINIVIL) 10 MG Tab Take 1 Tab by mouth every day. 30 Tab 2   • simvastatin (ZOCOR) 5 MG Tab Take 1 Tab by mouth every evening. 30 Tab 2   • omeprazole (PRILOSEC OTC) 20 MG tablet Take 20 mg by mouth every day.     • Ibuprofen (ADVIL PO) Take  by mouth.       No current facility-administered medications for this visit.      He  has a past medical history of Cancer (HCC); Diverticulosis; Fatty liver disease, nonalcoholic; Low back pain; and Mini stroke (HCC).    ROS as stated in hpi  No chest pain, no shortness of breath, + abdominal  "pain       Objective:     Blood pressure 130/82, pulse 92, temperature 36.4 °C (97.6 °F), height 1.778 m (5' 10\"), weight 117.9 kg (260 lb), SpO2 95 %. Body mass index is 37.31 kg/m². afebrile  Physical Exam:  Constitutional: Alert, no distress.  Skin: Warm, dry, good turgor,no cyanosis, no rashes in visible areas.  Eye: Equal, round and reactive, conjunctiva clear, lids normal.  Ears: No tenderness, no discharge.  External canals are without any significant edema or erythema.  T Gross auditory acuity is intact.  Nose: symmetrical without tenderness, no discharge.  Mouth/Throat: lips without lesion.  Oropharynx clear.   Neck: Trachea midline, no masses, no obvious thyroid enlargement.. No cervical or supraclavicular lymphadenopathy. Range of motion within normal limits.  Neuro: Cranial nerves 2-12 grossly intact.  No sensory deficit.  Respiratory: Unlabored respiratory effort, lungs clear to auscultation, no wheezes, no ronchi.  Cardiovascular: Normal S1, S2, no murmur, no edema.  Abdomen: Soft,tender in lower quadrants.  Bowel tones faint in all quads.  Slight rebound tenderness in llq.   Psych: Alert and oriented x3, normal affect and mood and judgement.        Assessment and Plan:   The following treatment plan was discussed    1. Type 2 diabetes mellitus without complication, without long-term current use of insulin (HCC)  Chronic, ongoing. Due for labs.  Orders provided.  Monitor and folow.   - HEMOGLOBIN A1C; Future  - MICROALBUMIN CREAT RATIO URINE; Future    2. Lower abdominal pain  This is a new problem to me.  Acute.  This could represent diverticulitis but will order an xray to rule out blockage or constipation.  RX for treatment of diverticulitis Bactrim and Flagyl.  Patient to call me in 2 days with an update.  Monitor and follow results. ED precautions reviewed with patient who verbalizes understanding.   - UL-YGRCESV-1 VIEWS; Future    3. Diverticulosis of large intestine without hemorrhage  This is a " new problem to me.  Historical/finding on colonoscopy.  See #2      Followup: Return if symptoms worsen or fail to improve.         Educated in proper administration of medication(s) ordered today including safety, possible SE, risks, benefits, rationale and alternatives to therapy.     Please note that this dictation was created using voice recognition software. I have made every reasonable attempt to correct obvious errors, but I expect that there are errors of grammar and possibly content that I did not discover before finalizing the note.

## 2018-10-02 NOTE — ASSESSMENT & PLAN NOTE
Started to have some abdominal pain in lower abdomen last Friday.  Some constipation reported.  Took a laxative with some help yesterday.  Still having pain.  Pain is specific to lower abdomen. No nausea/vomitting, but a lot of gas.  No fever, chills reported.  Reports horrific pain at 10 that is intermittent.  Taking daily prilosec.  No black or tarry stools.  Reports that in the past he has had a history of diverticulitis.

## 2018-10-15 ENCOUNTER — TELEPHONE (OUTPATIENT)
Dept: MEDICAL GROUP | Facility: PHYSICIAN GROUP | Age: 48
End: 2018-10-15

## 2018-10-16 NOTE — TELEPHONE ENCOUNTER
VOICEMAIL  1. Caller Name: Alex                      Call Back Number: [phone] (home)       2. Message: Patient states he tried the metformin again and is still having stomach and intestinal pains, he is wondering if there is something else he can try? Please advise     3. Patient approves office to leave a detailed voicemail/MyChart message: N\A

## 2018-10-16 NOTE — TELEPHONE ENCOUNTER
Please let patient know that I have called over to Mor a medication called Januvia.  It is 100 mg daily.  This is a newer medication for treating diabetes and will help with his blood sugars and weight loss.  Please have patient make an appointment for follow up in December.   JEAN Briggs.

## 2019-02-19 ENCOUNTER — HOSPITAL ENCOUNTER (OUTPATIENT)
Dept: LAB | Facility: MEDICAL CENTER | Age: 49
End: 2019-02-19
Attending: NURSE PRACTITIONER
Payer: COMMERCIAL

## 2019-02-19 DIAGNOSIS — E11.9 TYPE 2 DIABETES MELLITUS WITHOUT COMPLICATION, WITHOUT LONG-TERM CURRENT USE OF INSULIN (HCC): ICD-10-CM

## 2019-02-19 LAB
CREAT UR-MCNC: 211.4 MG/DL
EST. AVERAGE GLUCOSE BLD GHB EST-MCNC: 160 MG/DL
HBA1C MFR BLD: 7.2 % (ref 0–5.6)
MICROALBUMIN UR-MCNC: 0.8 MG/DL
MICROALBUMIN/CREAT UR: 4 MG/G (ref 0–30)

## 2019-02-19 PROCEDURE — 83036 HEMOGLOBIN GLYCOSYLATED A1C: CPT

## 2019-02-19 PROCEDURE — 82043 UR ALBUMIN QUANTITATIVE: CPT

## 2019-02-19 PROCEDURE — 82570 ASSAY OF URINE CREATININE: CPT

## 2019-02-19 PROCEDURE — 36415 COLL VENOUS BLD VENIPUNCTURE: CPT

## 2019-02-20 ENCOUNTER — TELEPHONE (OUTPATIENT)
Dept: MEDICAL GROUP | Facility: PHYSICIAN GROUP | Age: 49
End: 2019-02-20

## 2019-02-20 NOTE — TELEPHONE ENCOUNTER
----- Message from ROGER Briggs sent at 2/20/2019  7:13 AM PST -----  Good morning, Alex  Thanks for getting updated labs prior to our appointment on the 26th.  Your A1c has made significant improvements since our last visit.  It is 7.2 down from 11.7.  Great work!.  See you soon  ROGER rBiggs

## 2019-02-20 NOTE — LETTER
February 20, 2019         Alex Fong  [id number]  [place] [address]        Dear Alex:      Below are the results from your recent visit:    Thanks for getting updated labs prior to our appointment on the 26th.  Your A1c has made significant improvements since our last visit.  It is 7.2 down from 11.7.  Great work!.  See you soon   ROGER Briggs     Resulted Orders   HEMOGLOBIN A1C   Result Value Ref Range    Glycohemoglobin 7.2 (H) 0.0 - 5.6 %      Comment:      Increased risk for diabetes:  5.7 -6.4%  Diabetes:  >6.4%  Glycemic control for adults with diabetes:  <7.0%  The above interpretations are per ADA guidelines.  Diagnosis  of diabetes mellitus on the basis of elevated Hemoglobin A1c  should be confirmed by repeating the Hb A1c test.      Est Avg Glucose 160 mg/dL      Comment:      The eAG calculation is based on the A1c-Derived Daily Glucose  (ADAG) study.  See the ADA's website for additional information.     MICROALBUMIN CREAT RATIO URINE   Result Value Ref Range    Creatinine, Urine 211.40 mg/dL    Microalbumin, Urine Random 0.8 mg/dL    Micro Alb Creat Ratio 4 0 - 30 mg/g     If you have any questions or concerns, please don't hesitate to call.    Electronically Signed

## 2019-02-26 ENCOUNTER — OFFICE VISIT (OUTPATIENT)
Dept: MEDICAL GROUP | Facility: PHYSICIAN GROUP | Age: 49
End: 2019-02-26
Payer: COMMERCIAL

## 2019-02-26 VITALS
HEIGHT: 70 IN | HEART RATE: 82 BPM | RESPIRATION RATE: 14 BRPM | OXYGEN SATURATION: 97 % | BODY MASS INDEX: 36.51 KG/M2 | SYSTOLIC BLOOD PRESSURE: 134 MMHG | WEIGHT: 255 LBS | DIASTOLIC BLOOD PRESSURE: 84 MMHG | TEMPERATURE: 97.5 F

## 2019-02-26 DIAGNOSIS — E11.69 DIABETES MELLITUS TYPE 2 IN OBESE (HCC): ICD-10-CM

## 2019-02-26 DIAGNOSIS — M54.41 CHRONIC BILATERAL LOW BACK PAIN WITH RIGHT-SIDED SCIATICA: ICD-10-CM

## 2019-02-26 DIAGNOSIS — G89.29 CHRONIC BILATERAL LOW BACK PAIN WITH RIGHT-SIDED SCIATICA: ICD-10-CM

## 2019-02-26 DIAGNOSIS — I51.7 LEFT VENTRICULAR ENLARGEMENT: ICD-10-CM

## 2019-02-26 DIAGNOSIS — E66.9 DIABETES MELLITUS TYPE 2 IN OBESE (HCC): ICD-10-CM

## 2019-02-26 DIAGNOSIS — E66.9 OBESITY (BMI 35.0-39.9 WITHOUT COMORBIDITY): ICD-10-CM

## 2019-02-26 PROBLEM — R73.9 ELEVATED BLOOD SUGAR: Status: RESOLVED | Noted: 2018-08-30 | Resolved: 2019-02-26

## 2019-02-26 PROCEDURE — 99214 OFFICE O/P EST MOD 30 MIN: CPT | Performed by: NURSE PRACTITIONER

## 2019-02-26 RX ORDER — LISINOPRIL 10 MG/1
10 TABLET ORAL DAILY
Qty: 30 TAB | Refills: 6 | Status: SHIPPED | OUTPATIENT
Start: 2019-02-26

## 2019-02-26 ASSESSMENT — PATIENT HEALTH QUESTIONNAIRE - PHQ9: CLINICAL INTERPRETATION OF PHQ2 SCORE: 0

## 2019-02-26 NOTE — ASSESSMENT & PLAN NOTE
A1c down to 7.2 today.  Only able to tolerate one metformin daily. Has had some 5 pound weight loss. Recently lost job, so he is stressed.  Reports that he is not walking as much as he was.

## 2019-02-26 NOTE — ASSESSMENT & PLAN NOTE
[facility] is following this issue.  Has appointment tomorrow.  Taking advil for this at this time.

## 2019-02-26 NOTE — PROGRESS NOTES
"Chief Complaint   Patient presents with   • Follow-Up   • Diabetes       Subjective:   Alex Fong is a 48 y.o. male here today for evaluation and management of:    Diabetes mellitus type 2 in obese ([facility])  A1c down to 7.2 today.  Only able to tolerate one metformin daily. Has had some 5 pound weight loss. Recently lost job, so he is stressed.  Reports that he is not walking as much as he was.      Low back pain  [facility] is following this issue.  Has appointment tomorrow.  Taking advil for this at this time.     Obesity (BMI 35.0-39.9 without comorbidity) ([facility])  BMI 36.59 today.  Down 5 pounds.     Left ventricular enlargement  Has not followed up with referral, most likely due to loss of job.  No chest pain reported, no shortness of breath or ankle edema reported.            Current medicines (including changes today)  Current Outpatient Prescriptions   Medication Sig Dispense Refill   • lisinopril (PRINIVIL) 10 MG Tab Take 1 Tab by mouth every day. 30 Tab 6   • metFORMIN (GLUCOPHAGE) 500 MG Tab Take 1 Tab by mouth 2 times a day, with meals. (Patient taking differently: Take 500 mg by mouth every day at 6 PM.) 60 Tab 3   • simvastatin (ZOCOR) 5 MG Tab Take 1 Tab by mouth every evening. 30 Tab 2   • omeprazole (PRILOSEC OTC) 20 MG tablet Take 20 mg by mouth every day.     • dicyclomine (BENTYL) 10 MG Cap Take 1 Cap by mouth 4 Times a Day,Before Meals and at Bedtime. (Patient not taking: Reported on 2/26/2019) 30 Cap 0   • Ibuprofen (ADVIL PO) Take  by mouth.       No current facility-administered medications for this visit.      He  has a past medical history of Cancer (HCC); Diverticulosis; Fatty liver disease, nonalcoholic; Low back pain; and Mini stroke ([facility]).    ROS as stated in hpi  No chest pain, no shortness of breath, no abdominal pain       Objective:     Blood pressure 134/84, pulse 82, temperature 36.4 °C (97.5 °F), temperature source Temporal, resp. rate 14, height 1.778 m (5' 10\"), weight 115.7 kg " (255 lb), SpO2 97 %. Body mass index is 36.59 kg/m².   Physical Exam:  Constitutional: Alert, no distress.  Skin: Warm, dry, good turgor,no cyanosis, no rashes in visible areas.  Eye: Equal, round and reactive, conjunctiva clear, lids normal.  Ears: No tenderness, no discharge.  External canals are without any significant edema or erythema..  Gross auditory acuity is intact.  Nose: symmetrical without tenderness, no discharge.  Mouth/Throat: lips without lesion.  Oropharynx clear.    Neck: Trachea midline, no masses, no obvious thyroid enlargement.. No cervical or supraclavicular lymphadenopathy. Range of motion within normal limits.  Neuro: Cranial nerves 2-12 grossly intact.  No sensory deficit.  Respiratory: Unlabored respiratory effort, lungs clear to auscultation, no wheezes, no ronchi.  Cardiovascular: Normal S1, S2, no murmur, no edema.  Psych: Alert and oriented x3, normal affect and mood and judgement.        Assessment and Plan:   The following treatment plan was discussed    1. Diabetes mellitus type 2 in obese (HCC)  Chronic, ongoing, improved A1c.  Continue with metformin, diet, exercise and weight loss.  Return in 6 months.     2. Chronic bilateral low back pain with right-sided sciatica  Chronic, ongoing. Followed by [facility].  Appointment tomorrow for worsening pain.     3. Obesity (BMI 35.0-39.9 without comorbidity) (HCC)  Chronic, ongoing, improved. Down 5 pounds.  Continue with lifestyle management. Monitor and follow.     4. Left ventricular enlargement  Chronic, historical issue.  Has not been able to afford visit due to recent job loss.  Red flag warnings reviewed.  Monitor.       Followup: Return in about 6 months (around 8/26/2019) for Diabetes.         Educated in proper administration of medication(s) ordered today including safety, possible SE, risks, benefits, rationale and alternatives to therapy.     Please note that this dictation was created using voice recognition software. I  have made every reasonable attempt to correct obvious errors, but I expect that there are errors of grammar and possibly content that I did not discover before finalizing the note.

## 2019-02-26 NOTE — ASSESSMENT & PLAN NOTE
Has not followed up with referral, most likely due to loss of job.  No chest pain reported, no shortness of breath or ankle edema reported.

## 2019-08-19 ENCOUNTER — OFFICE (OUTPATIENT)
Dept: URBAN - METROPOLITAN AREA CLINIC 78 | Facility: CLINIC | Age: 49
End: 2019-08-19

## 2019-08-19 VITALS
SYSTOLIC BLOOD PRESSURE: 135 MMHG | HEART RATE: 84 BPM | WEIGHT: 253 LBS | DIASTOLIC BLOOD PRESSURE: 88 MMHG | TEMPERATURE: 97.6 F | HEIGHT: 70 IN

## 2019-08-19 DIAGNOSIS — K21.9 GASTRO-ESOPHAGEAL REFLUX DISEASE WITHOUT ESOPHAGITIS: ICD-10-CM

## 2019-08-19 DIAGNOSIS — K57.92 DIVERTICULITIS OF INTESTINE, PART UNSPECIFIED, WITHOUT PERFO: ICD-10-CM

## 2019-08-19 DIAGNOSIS — R19.5 OTHER FECAL ABNORMALITIES: ICD-10-CM

## 2019-08-19 DIAGNOSIS — R10.32 LEFT LOWER QUADRANT PAIN: ICD-10-CM

## 2019-08-19 PROCEDURE — 99204 OFFICE O/P NEW MOD 45 MIN: CPT

## 2019-08-19 NOTE — SERVICEHPINOTES
DEBBIE SINGH   is a   49   male who presents with abdominal pain. Last year, sugar levels were elevated. Stared metformin and developed abdominal cramps.  Stopped taking it and was doing okay. BRLast May, went to ER with flank pain and told to have kidney stone. BRLast Sunday, developed lower abdominal cramps similar to when taking metformin. Not sure if has kidney stone or intestinal issues. Slight constipation then had some loose bowel, tarry and black. Denies fever or chills. BRNow BMs are back to normal. Still has slight pain at LLQ. Denies blood in stool or weight loss. BRDenies nausea, vomiting, dysphagia, dyspepsia or early satiety. BR+ Acid reflux, takes Prilosec 20 mg once daily over 19 years. Last EGD was done in 2008, reports a hx of h pylori s/p treatment back then. BRTook 2 tabs of Advil PM for lower abdominal pain.  Takes Advil once a week for back pain. Reports a hx of diverticulitis in 2010. BRHad a colonoscopy back then. Denies hx polyps. Advised to repeat it in 5 years. BRHx of testicular caner in 2015 s/p resection of left testicle. No chemo or radiation was required. Denies chest pain, palpitations or sob. BRDenies family history of colon cancer. BRSister had polyps in 50's. BR

## 2019-09-17 ENCOUNTER — OFFICE (OUTPATIENT)
Dept: URBAN - METROPOLITAN AREA CLINIC 32 | Facility: CLINIC | Age: 49
End: 2019-09-17

## 2019-09-17 ENCOUNTER — OFFICE (OUTPATIENT)
Dept: URBAN - METROPOLITAN AREA CLINIC 33 | Facility: CLINIC | Age: 49
End: 2019-09-17

## 2019-09-17 ENCOUNTER — OFFICE (OUTPATIENT)
Dept: URBAN - METROPOLITAN AREA PATHOLOGY 17 | Facility: PATHOLOGY | Age: 49
End: 2019-09-17

## 2019-09-17 VITALS
DIASTOLIC BLOOD PRESSURE: 69 MMHG | SYSTOLIC BLOOD PRESSURE: 157 MMHG | HEIGHT: 70 IN | DIASTOLIC BLOOD PRESSURE: 77 MMHG | DIASTOLIC BLOOD PRESSURE: 86 MMHG | SYSTOLIC BLOOD PRESSURE: 106 MMHG | TEMPERATURE: 97.2 F | RESPIRATION RATE: 18 BRPM | SYSTOLIC BLOOD PRESSURE: 133 MMHG | RESPIRATION RATE: 14 BRPM | HEART RATE: 76 BPM | SYSTOLIC BLOOD PRESSURE: 91 MMHG | SYSTOLIC BLOOD PRESSURE: 126 MMHG | DIASTOLIC BLOOD PRESSURE: 81 MMHG | TEMPERATURE: 97.7 F | OXYGEN SATURATION: 96 % | OXYGEN SATURATION: 100 % | HEART RATE: 79 BPM | WEIGHT: 253 LBS | HEART RATE: 72 BPM | RESPIRATION RATE: 12 BRPM | RESPIRATION RATE: 16 BRPM | OXYGEN SATURATION: 99 % | DIASTOLIC BLOOD PRESSURE: 57 MMHG | OXYGEN SATURATION: 98 % | HEART RATE: 63 BPM

## 2019-09-17 DIAGNOSIS — K92.1 MELENA: ICD-10-CM

## 2019-09-17 DIAGNOSIS — K21.9 GASTRO-ESOPHAGEAL REFLUX DISEASE WITHOUT ESOPHAGITIS: ICD-10-CM

## 2019-09-17 DIAGNOSIS — K31.89 OTHER DISEASES OF STOMACH AND DUODENUM: ICD-10-CM

## 2019-09-17 PROCEDURE — 88342 IMHCHEM/IMCYTCHM 1ST ANTB: CPT

## 2019-09-17 PROCEDURE — 88305 TISSUE EXAM BY PATHOLOGIST: CPT

## 2019-09-17 PROCEDURE — 00031: CPT

## 2019-09-17 PROCEDURE — 88313 SPECIAL STAINS GROUP 2: CPT

## 2019-09-17 PROCEDURE — 43239 EGD BIOPSY SINGLE/MULTIPLE: CPT

## 2019-10-29 ENCOUNTER — OFFICE (OUTPATIENT)
Dept: URBAN - METROPOLITAN AREA CLINIC 32 | Facility: CLINIC | Age: 49
End: 2019-10-29

## 2019-10-29 VITALS
SYSTOLIC BLOOD PRESSURE: 143 MMHG | WEIGHT: 253 LBS | RESPIRATION RATE: 14 BRPM | HEART RATE: 80 BPM | SYSTOLIC BLOOD PRESSURE: 112 MMHG | DIASTOLIC BLOOD PRESSURE: 84 MMHG | DIASTOLIC BLOOD PRESSURE: 67 MMHG | SYSTOLIC BLOOD PRESSURE: 154 MMHG | HEIGHT: 70 IN | OXYGEN SATURATION: 98 % | TEMPERATURE: 97.7 F | SYSTOLIC BLOOD PRESSURE: 119 MMHG | RESPIRATION RATE: 16 BRPM | HEART RATE: 81 BPM | SYSTOLIC BLOOD PRESSURE: 141 MMHG | OXYGEN SATURATION: 100 % | OXYGEN SATURATION: 95 % | DIASTOLIC BLOOD PRESSURE: 90 MMHG | TEMPERATURE: 97.5 F | RESPIRATION RATE: 17 BRPM | OXYGEN SATURATION: 99 % | DIASTOLIC BLOOD PRESSURE: 81 MMHG | DIASTOLIC BLOOD PRESSURE: 68 MMHG | RESPIRATION RATE: 15 BRPM | HEART RATE: 90 BPM | HEART RATE: 65 BPM

## 2019-10-29 DIAGNOSIS — K57.30 DIVERTICULOSIS OF LARGE INTESTINE WITHOUT PERFORATION OR ABS: ICD-10-CM

## 2019-10-29 DIAGNOSIS — K57.92 DIVERTICULITIS OF INTESTINE, PART UNSPECIFIED, WITHOUT PERFO: ICD-10-CM

## 2019-10-29 DIAGNOSIS — R19.5 OTHER FECAL ABNORMALITIES: ICD-10-CM

## 2019-10-29 PROCEDURE — 45378 DIAGNOSTIC COLONOSCOPY: CPT

## 2020-03-04 ENCOUNTER — OFFICE (OUTPATIENT)
Dept: URBAN - METROPOLITAN AREA CLINIC 78 | Facility: CLINIC | Age: 50
End: 2020-03-04

## 2020-03-04 PROCEDURE — 00014: CPT
